# Patient Record
Sex: FEMALE | Race: WHITE | URBAN - METROPOLITAN AREA
[De-identification: names, ages, dates, MRNs, and addresses within clinical notes are randomized per-mention and may not be internally consistent; named-entity substitution may affect disease eponyms.]

---

## 2020-07-31 ENCOUNTER — VIRTUAL VISIT (OUTPATIENT)
Dept: ENDOCRINOLOGY | Age: 60
End: 2020-07-31
Payer: COMMERCIAL

## 2020-07-31 PROCEDURE — 99204 OFFICE O/P NEW MOD 45 MIN: CPT | Performed by: INTERNAL MEDICINE

## 2020-07-31 RX ORDER — LEVOTHYROXINE SODIUM 0.15 MG/1
150 TABLET ORAL DAILY
Qty: 90 TABLET | Refills: 3 | Status: SHIPPED | OUTPATIENT
Start: 2020-07-31

## 2020-07-31 NOTE — PROGRESS NOTES
[address] [facility] Department of Endocrinology Diabetes and Metabolism   [address] [facility], [address] [facility],[address] [address]   Phone: [phone]  Fax: [phone]    Date of Service: 7/31/2020    Primary Care Physician: Little Finley DO  Referring physician: Kiara Bernard   Provider: Nury David MD     Reason for the visit:  Thyroid cancer, Hypothyroidism     History of Present Illness: The history is provided by the patient. No  was used. Accuracy of the patient data is excellent. Surjit Gillette is a very pleasant 62 y.o. female seen today for management of thyroid cancer     Pt was diagnosed with thyroid cancer in 2008 s/p Rt lobectomy. Pathology report --> micro-carcinoma     Thyroid US 9/2010:   Rt lobe surgically removed, normal appearing Left lobe and isthmus     Pt currently on Levothyroxine 150 mcg daily. Patient takes levothyroxine in the morning at empty stomach, wait one hour before eating , avoid multivitamins containing calcium  or iron with it. Recent labs 9/29/2020 - TSH 0.68  3/16/2020 - Tg level 3.1     Just had US at diagnostic center [facility]. Result not available today     Surjit Gillette denies any new lumps, bumps in the neck, voice change or shortness of breath. No family history of thyroid cancer. No prior history of radiation to head or neck region. PAST MEDICAL HISTORY   Past Medical History:   Diagnosis Date    Acute MI [facility]) 12/1996    Allergic rhinitis     Anxiety attack     CAD (coronary artery disease)     Carotid stenosis     Dyslipidemia     History of acute inferior wall MI     History of recurrent UTIs     Hypertension     Hypothyroidism     S/P partial thyroidectomy, 11/2008.  Vitamin D deficiency        PAST SURGICAL HISTORY   Past Surgical History:   Procedure Laterality Date    ATHERECTOMY  7/22/1998    Rotational atherectomy of RCA.  DIAGNOSTIC CARDIAC CATH LAB PROCEDURE  12/1996    [facility] - 3 stents>RCA.  DIAGNOSTIC CARDIAC CATH LAB PROCEDURE  12/1997    [facility] - stent> RCA.  DIAGNOSTIC CARDIAC CATH LAB PROCEDURE  2/17/1997    [facility]; patent RCA stented area.  DIAGNOSTIC CARDIAC CATH LAB PROCEDURE  7/22/1998    [address]  DIAGNOSTIC CARDIAC CATH LAB PROCEDURE  4/19/1999    [address] Trivial CAD.  DIAGNOSTIC CARDIAC CATH LAB PROCEDURE  1/31/2001    [address] Previous stents wide open. Medical management.  DIAGNOSTIC CARDIAC CATH LAB PROCEDURE  12/27/2005    [facility].  HYSTERECTOMY  2/2002    UPPER GASTROINTESTINAL ENDOSCOPY  10/10    Upper endoscopy and colonoscopy. Diagnosis: GERD AND DIVERTICULITIS/CECAL POLYP. SOCIAL HISTORY   Tobacco:   reports that she has quit smoking. Her smoking use included cigarettes. She has a 15.00 pack-year smoking history. She has never used smokeless tobacco.  Alcohol:   reports current alcohol use. Drugs:   reports no history of drug use. FAMILY HISTORY   Family History   Problem Relation Age of Onset    Thyroid Cancer Neg Hx        ALLERGIES AND DRUG REACTIONS   Allergies   Allergen Reactions    Latex     Famotidine     Sulfa Antibiotics     Zocor [Simvastatin]      Sensitivity. CURRENT MEDICATIONS   Current Outpatient Medications   Medication Sig Dispense Refill    levothyroxine (SYNTHROID) 150 MCG tablet Take 1 tablet by mouth Daily 90 tablet 3    nitroGLYCERIN (NITROSTAT) 0.4 MG SL tablet Place 1 tablet under the tongue as needed. 25 tablet 4    aspirin 325 MG tablet Take 325 mg by mouth daily.  multivitamin (THERAGRAN) per tablet Take 1 tablet by mouth daily.  Rosuvastatin Calcium (CRESTOR PO) Take 40 mg by mouth daily.  metoprolol (LOPRESSOR) 25 MG tablet Take 25 mg by mouth daily.  levocetirizine (XYZAL) 5 MG tablet Take 5 mg by mouth daily.  lisinopril (PRINIVIL;ZESTRIL) 10 MG tablet Take 5 mg by mouth daily       Calcium Carbonate-Vitamin D (CALCIUM + D PO) Take 1,200 mg by mouth daily.       b complex vitamins capsule Take 1 capsule by mouth daily. No current facility-administered medications for this visit. Review of Systems  Constitutional: No fever, no chills, no diaphoresis, no generalized weakness. HEENT: No blurred vision, No sore throat, no ear pain, no hair loss  Neck: denied any neck swelling, difficulty swallowing,   Cadrdiopulomary: No CP, SOB or palpitation, No orthopnea or PND. No cough or wheezing. GI: No N/V/D, no constipation, No abdominal pain, no melena or hematochezia   : Denied any dysuria, hematuria, flank pain, discharge, or incontinence. Skin: denied any rash, ulcer, Hirsute, or hyperpigmentation. MSK: denied any joint deformity, joint pain/swelling, muscle pain, or back pain. Neuro: no numbess, no tingling, no weakness,     OBJECTIVE    There were no vitals taken for this visit. BP Readings from Last 4 Encounters:   09/08/14 110/70   03/13/13 122/70     Wt Readings from Last 6 Encounters:   09/08/14 248 lb 3.2 oz (112.6 kg)   03/13/13 254 lb (115.2 kg)     Physical examination:  Due to this being a TeleHealth encounter, evaluation of the following organ systems is limited: Vitals/Constitutional/EENT/Resp/CV/GI//MS/Neuro/Skin/Heme-Lymph-Imm. Modified physical exam through Telemedicine camera    General: Communicating well via camera   Neck: s/p Rt thyroid lobectomy, no obvious neck mass. No obvious neck deformity     CVS: no distress   Chest: no distress. Chest is moving with respiration    Extremities:  no visible tremor  Skin: No visible rashes as seen from camera   Musculoskeletal: no visible deformity  Neuro: Alert and oriented to person, place, and time. Psychiatric: Normal mood and affect.  Behavior is normal      Review of Laboratory Data:  I have reviewed the following:  COMP METABOLIC PANEL (26/79/0592 11:50 AM EDT)  Component Value   Glucose 88   Urea Nitrogen 19 (H)   Creatinine 0.64   Total Bilirubin 0.6   Total Protein 7.6   Albumin 3.9 A/G RATIO 1.1   Calcium(Ca) 9.5   Alkaline Phosphatase 70   (AST) 22   Alanine Aminotrans(ALT) 32   Sodium(Na) 135 (L)   Potassium(K) 3.8   Chloride(Cl) 99   Carbon Dioxide(CO2) 33.0 (H)   ANION GAP 6.8   CALCULATED OSMOLALITY 282   EGFR  96     CBC AND DIFF W/ PLATELETS (26/82/4053 11:50 AM EDT)  Component Value   WBC 6.1   RBC 4.72   Hgb 14.4   Hematocrit(HCT) 42.6   MCV 90.3   MCH 30.5   MCHC 33.8   RDW 12.9   Platelets 913       No results found for: WBC, RBC, HGB, HCT, MCV, MCH, MCHC, RDW, PLT, MPV, GRANULOCYTES, BANDS   No results found for: NA, K, CO2, BUN, CREATININE, CALCIUM, LABGLOM, GFRAA   No results found for: TSH, T4FREE, O8FQVQN, FT3, L6LBRNI, TSI, TPOABS, THGAB  No results found for: LABA1C, GLUCOSE, MALBCR, LABMICR, LABCREA  No results found for: TRIG, HDL, LDLCALC, CHOL  No results found for: VITD25    Medical Records/Labs/Images Review:   I personally reviewed and summarized previous records   All labs and imaging were reviewed independently    Sol Swartz, a 62 y.o.-old female seen in for following issues     Papillary thyroid cancer   · S/p Rt lobectomy in 2008. Thyroid US at [facility] 10/2010 --> negative   · Just had US at [facility], will call for result   · There was no evidence of thyroid cancer recurrence in ultrasounds  · Will continue following with Tg level (level was 3.1 in 3/2020)     Post surgical hypothyroidism:  · At goal, continue Levothyroxine 150 mcg daily  · Goal to keep TSH 0.3-2   · TFT in 6 months     HLD  · Continue Crestor 40 mg daily    Return in about 6 months (around 1/31/2021) for thyroid cancer . The above issues were reviewed with the patient who understood and agreed with the plan. Thank you for allowing us to participate in the care of this patient. Please do not hesitate to contact us with any additional questions. Diagnosis Orders   1.  Thyroid cancer ([facility] [address]  TSH without Reflex    Thyroglobulin    T4, Free 2. Postsurgical hypothyroidism  TSH without Reflex    T4, Free    levothyroxine (SYNTHROID) 150 MCG tablet   3. Vitamin D deficiency  Vitamin D 25 Hydroxy       Ernestina Keith MD  Endocrinologist, [facility])   [address] [facility], [address] [place] [address],[address] [address]   Phone: [phone]  Fax: [phone]  ----------------------------  An electronic signature was used to authenticate this note. Karena Hatchet, MD on 7/31/2020 at 9:09 PM  Services were provided through a video synchronous discussion virtually to substitute for in-person clinic visit. Pursuant to the emergency declaration under the [facility] waiver authority and the [employer] and [employer] Act, this Virtual  Visit was conducted, with patient's consent, to reduce the patient's risk of exposure to COVID-19 and provide continuity of care.

## 2020-07-31 NOTE — LETTER
Recent labs 9/29/2020 - TSH 0.68  3/16/2020 - Tg level 3.1     Just had US at diagnostic center [facility]. Result not available today     Clayton Panda denies any new lumps, bumps in the neck, voice change or shortness of breath. No family history of thyroid cancer. No prior history of radiation to head or neck region. PAST MEDICAL HISTORY   Past Medical History:   Diagnosis Date    Acute MI [facility]) 12/1996    Allergic rhinitis     Anxiety attack     CAD (coronary artery disease)     Carotid stenosis     Dyslipidemia     History of acute inferior wall MI     History of recurrent UTIs     Hypertension     Hypothyroidism     S/P partial thyroidectomy, 11/2008.  Vitamin D deficiency        PAST SURGICAL HISTORY   Past Surgical History:   Procedure Laterality Date    ATHERECTOMY  7/22/1998    Rotational atherectomy of RCA.  DIAGNOSTIC CARDIAC CATH LAB PROCEDURE  12/1996    [facility] - 3 stents>RCA.  DIAGNOSTIC CARDIAC CATH LAB PROCEDURE  12/1997    [facility] - stent> RCA.  DIAGNOSTIC CARDIAC CATH LAB PROCEDURE  2/17/1997    [facility]; patent RCA stented area.  DIAGNOSTIC CARDIAC CATH LAB PROCEDURE  7/22/1998    [address]  DIAGNOSTIC CARDIAC CATH LAB PROCEDURE  4/19/1999    [address] Trivial CAD.  DIAGNOSTIC CARDIAC CATH LAB PROCEDURE  1/31/2001    [address] Previous stents wide open. Medical management.  DIAGNOSTIC CARDIAC CATH LAB PROCEDURE  12/27/2005    [facility].  HYSTERECTOMY  2/2002    UPPER GASTROINTESTINAL ENDOSCOPY  10/10    Upper endoscopy and colonoscopy. Diagnosis: GERD AND DIVERTICULITIS/CECAL POLYP. SOCIAL HISTORY   Tobacco:   reports that she has quit smoking. Her smoking use included cigarettes. She has a 15.00 pack-year smoking history. She has never used smokeless tobacco.  Alcohol:   reports current alcohol use. Drugs:   reports no history of drug use.     FAMILY HISTORY   Family History   Problem Relation Age of Onset  Thyroid Cancer Neg Hx        ALLERGIES AND DRUG REACTIONS   Allergies   Allergen Reactions    Latex     Famotidine     Sulfa Antibiotics     Zocor [Simvastatin]      Sensitivity. CURRENT MEDICATIONS   Current Outpatient Medications   Medication Sig Dispense Refill    levothyroxine (SYNTHROID) 150 MCG tablet Take 1 tablet by mouth Daily 90 tablet 3    nitroGLYCERIN (NITROSTAT) 0.4 MG SL tablet Place 1 tablet under the tongue as needed. 25 tablet 4    aspirin 325 MG tablet Take 325 mg by mouth daily.  multivitamin (THERAGRAN) per tablet Take 1 tablet by mouth daily.  Rosuvastatin Calcium (CRESTOR PO) Take 40 mg by mouth daily.  metoprolol (LOPRESSOR) 25 MG tablet Take 25 mg by mouth daily.  levocetirizine (XYZAL) 5 MG tablet Take 5 mg by mouth daily.  lisinopril (PRINIVIL;ZESTRIL) 10 MG tablet Take 5 mg by mouth daily       Calcium Carbonate-Vitamin D (CALCIUM + D PO) Take 1,200 mg by mouth daily.  b complex vitamins capsule Take 1 capsule by mouth daily. No current facility-administered medications for this visit. Review of Systems  Constitutional: No fever, no chills, no diaphoresis, no generalized weakness. HEENT: No blurred vision, No sore throat, no ear pain, no hair loss  Neck: denied any neck swelling, difficulty swallowing,   Cadrdiopulomary: No CP, SOB or palpitation, No orthopnea or PND. No cough or wheezing. GI: No N/V/D, no constipation, No abdominal pain, no melena or hematochezia   : Denied any dysuria, hematuria, flank pain, discharge, or incontinence. Skin: denied any rash, ulcer, Hirsute, or hyperpigmentation. MSK: denied any joint deformity, joint pain/swelling, muscle pain, or back pain. Neuro: no numbess, no tingling, no weakness,     OBJECTIVE    There were no vitals taken for this visit.   BP Readings from Last 4 Encounters:   09/08/14 110/70   03/13/13 122/70     Wt Readings from Last 6 Encounters: 09/08/14 248 lb 3.2 oz (112.6 kg)   03/13/13 254 lb (115.2 kg)     Physical examination:  Due to this being a TeleHealth encounter, evaluation of the following organ systems is limited: Vitals/Constitutional/EENT/Resp/CV/GI//MS/Neuro/Skin/Heme-Lymph-Imm. Modified physical exam through Telemedicine camera    General: Communicating well via camera   Neck: s/p Rt thyroid lobectomy, no obvious neck mass. No obvious neck deformity     CVS: no distress   Chest: no distress. Chest is moving with respiration    Extremities:  no visible tremor  Skin: No visible rashes as seen from camera   Musculoskeletal: no visible deformity  Neuro: Alert and oriented to person, place, and time. Psychiatric: Normal mood and affect.  Behavior is normal      Review of Laboratory Data:  I have reviewed the following:  COMP METABOLIC PANEL (50/14/3136 11:50 AM EDT)  Component Value   Glucose 88   Urea Nitrogen 19 (H)   Creatinine 0.64   Total Bilirubin 0.6   Total Protein 7.6   Albumin 3.9   A/G RATIO 1.1   Calcium(Ca) 9.5   Alkaline Phosphatase 70   (AST) 22   Alanine Aminotrans(ALT) 32   Sodium(Na) 135 (L)   Potassium(K) 3.8   Chloride(Cl) 99   Carbon Dioxide(CO2) 33.0 (H)   ANION GAP 6.8   CALCULATED OSMOLALITY 282   EGFR  96     CBC AND DIFF W/ PLATELETS (63/44/4532 11:50 AM EDT)  Component Value   WBC 6.1   RBC 4.72   Hgb 14.4   Hematocrit(HCT) 42.6   MCV 90.3   MCH 30.5   MCHC 33.8   RDW 12.9   Platelets 387       No results found for: WBC, RBC, HGB, HCT, MCV, MCH, MCHC, RDW, PLT, MPV, GRANULOCYTES, BANDS   No results found for: NA, K, CO2, BUN, CREATININE, CALCIUM, LABGLOM, GFRAA   No results found for: TSH, T4FREE, J2OJWGB, FT3, R3CCIWW, TSI, TPOABS, THGAB  No results found for: LABA1C, GLUCOSE, MALBCR, LABMICR, LABCREA  No results found for: TRIG, HDL, LDLCALC, CHOL  No results found for: VITD25    Medical Records/Labs/Images Review:   I personally reviewed and summarized previous records All labs and imaging were reviewed independently    Sol Swartz, a 62 y.o.-old female seen in for following issues     Papillary thyroid cancer   · S/p Rt lobectomy in 2008. Thyroid US at [facility] 10/2010 --> negative   · Just had US at [facility], will call for result   · There was no evidence of thyroid cancer recurrence in ultrasounds  · Will continue following with Tg level (level was 3.1 in 3/2020)     Post surgical hypothyroidism:  · At goal, continue Levothyroxine 150 mcg daily  · Goal to keep TSH 0.3-2   · TFT in 6 months     HLD  · Continue Crestor 40 mg daily    Return in about 6 months (around 1/31/2021) for thyroid cancer . The above issues were reviewed with the patient who understood and agreed with the plan. Thank you for allowing us to participate in the care of this patient. Please do not hesitate to contact us with any additional questions. Diagnosis Orders   1. Thyroid cancer ([address]  TSH without Reflex    Thyroglobulin    T4, Free   2. Postsurgical hypothyroidism  TSH without Reflex    T4, Free    levothyroxine (SYNTHROID) 150 MCG tablet   3. Vitamin D deficiency  Vitamin D 25 Hydroxy       Brian Kim MD  Endocrinologist, [facility])   [address] [place] [address], [address] [place] [address],[facility] [address] [address]   Phone: [phone]  Fax: [phone]  ----------------------------  An electronic signature was used to authenticate this note. Reyna Georges MD on 7/31/2020 at 9:09 PM  Services were provided through a video synchronous discussion virtually to substitute for in-person clinic visit. Pursuant to the emergency declaration under the [facility] [address] waiver authority and the [employer] Act, this Virtual  Visit was conducted, with patient's consent, to reduce the patient's risk of exposure to COVID-19 and provide continuity of care.

## 2020-07-31 NOTE — PROGRESS NOTES
Renee Agustin was read the following message We want to confirm that, for purposes of billing, this is a virtual visit with your provider for which we will submit a claim for reimbursement with your insurance company. You will be responsible for any copays, coinsurance amounts or other amounts not covered by your insurance company. If you do not accept this, unfortunately we will not be able to schedule a virtual visit with the provider. Do you accept?  Boris Schmidt responded YES

## 2021-01-27 LAB
T4 FREE: 1.42
TSH SERPL DL<=0.05 MIU/L-ACNC: 1.04 UIU/ML
VITAMIN D 25-HYDROXY: 60
VITAMIN D2, 25 HYDROXY: NORMAL
VITAMIN D3,25 HYDROXY: NORMAL

## 2021-02-02 DIAGNOSIS — E89.0 POSTSURGICAL HYPOTHYROIDISM: ICD-10-CM

## 2021-02-02 DIAGNOSIS — E55.9 VITAMIN D DEFICIENCY: ICD-10-CM

## 2021-02-02 DIAGNOSIS — C73 THYROID CANCER (HCC): ICD-10-CM

## 2021-02-05 ENCOUNTER — TELEPHONE (OUTPATIENT)
Dept: ENDOCRINOLOGY | Age: 61
End: 2021-02-05

## 2021-02-05 NOTE — TELEPHONE ENCOUNTER
Notify pt,  I have reviewed your recent results    Great!, thyroid hormones and vitamin D results are within an acceptable range of normal. There is no need for a change in your therapy at this time.

## 2021-03-17 ENCOUNTER — TELEPHONE (OUTPATIENT)
Dept: ADMINISTRATIVE | Age: 61
End: 2021-03-17